# Patient Record
Sex: FEMALE | Race: OTHER | NOT HISPANIC OR LATINO
[De-identification: names, ages, dates, MRNs, and addresses within clinical notes are randomized per-mention and may not be internally consistent; named-entity substitution may affect disease eponyms.]

---

## 2017-07-12 ENCOUNTER — APPOINTMENT (OUTPATIENT)
Dept: OTOLARYNGOLOGY | Facility: CLINIC | Age: 47
End: 2017-07-12

## 2017-07-12 VITALS — HEIGHT: 66 IN | BODY MASS INDEX: 28.93 KG/M2 | WEIGHT: 180 LBS

## 2017-07-12 DIAGNOSIS — F17.210 NICOTINE DEPENDENCE, CIGARETTES, UNCOMPLICATED: ICD-10-CM

## 2017-07-12 DIAGNOSIS — Z80.3 FAMILY HISTORY OF MALIGNANT NEOPLASM OF BREAST: ICD-10-CM

## 2017-07-12 DIAGNOSIS — E16.2 HYPOGLYCEMIA, UNSPECIFIED: ICD-10-CM

## 2017-07-12 DIAGNOSIS — Z87.891 PERSONAL HISTORY OF NICOTINE DEPENDENCE: ICD-10-CM

## 2017-07-20 ENCOUNTER — APPOINTMENT (OUTPATIENT)
Dept: OTOLARYNGOLOGY | Facility: CLINIC | Age: 47
End: 2017-07-20

## 2019-05-28 ENCOUNTER — APPOINTMENT (OUTPATIENT)
Dept: OTOLARYNGOLOGY | Facility: CLINIC | Age: 49
End: 2019-05-28
Payer: MEDICAID

## 2019-05-28 VITALS
HEIGHT: 66 IN | BODY MASS INDEX: 28.93 KG/M2 | HEART RATE: 75 BPM | SYSTOLIC BLOOD PRESSURE: 139 MMHG | WEIGHT: 180 LBS | DIASTOLIC BLOOD PRESSURE: 89 MMHG

## 2019-05-28 PROCEDURE — 99204 OFFICE O/P NEW MOD 45 MIN: CPT | Mod: 25

## 2019-05-28 PROCEDURE — 31231 NASAL ENDOSCOPY DX: CPT

## 2019-05-28 RX ORDER — AMOXICILLIN AND CLAVULANATE POTASSIUM 875; 125 MG/1; MG/1
875-125 TABLET, COATED ORAL
Qty: 20 | Refills: 0 | Status: COMPLETED | COMMUNITY
Start: 2017-04-26 | End: 2019-05-28

## 2019-05-28 RX ORDER — OMEPRAZOLE 40 MG/1
40 CAPSULE, DELAYED RELEASE ORAL
Qty: 30 | Refills: 2 | Status: ACTIVE | COMMUNITY
Start: 2019-05-28 | End: 1900-01-01

## 2019-06-05 NOTE — PHYSICAL EXAM
[Midline] : trachea located in midline position [Normal] : no rashes [de-identified] : Good flow b/l

## 2019-06-05 NOTE — ASSESSMENT
[FreeTextEntry1] : pt with reported left SMG stone with sx that may have some contribution. 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 Escalera recommended removal, will consider and may do sialendoscopy at the same time\par get u/s report with stone information \par Will proceed with regiment to increase salivary flow to reduce pooling in the gland and washout any possible obstruction if possible. Recommended hydration, regular massage, sour candies, and warm compress\par \par \par I personally saw and examined BRIDGETT SERRA in detail. I spoke to HENRIK Resendiz regarding the assessment and plan of care.  I preformed the procedures and I reviewed the above assessment and plan of care, and agree. I have made changes in changes in the body of the note where appropriate.\par \par

## 2019-06-05 NOTE — HISTORY OF PRESENT ILLNESS
[de-identified] : 47 y/o female referred for possible left SMG stone seen on prior neck US. She has several year history of left sided cheek and upper neck pain with foul taste in mouth. Also with dry mouth and some difficulty swallowing solids and liquids. + Non-productive cough. Pain is not worse with eating. No throat pain, no neck or cheek swelling. \par \par Also with nasal dryness and pressure under left eye many years. Prior sinus surgery - 4 years ago- left sided- for recurrent sinus infections and "cyst" removal.  Has had 2 sinus infections in the past 12 months and at least 4 sinus infections the year prior treated with oral abx. No nasal obstruction. No runny nose. \par \par \par \par

## 2019-06-05 NOTE — CONSULT LETTER
[DrDevan  ___] : Dr. THOMAS [FreeTextEntry2] : ENt located at [facility] in [place]  [FreeTextEntry1] : Dear Dr. Chuy Addison,\par \par I had the pleasure of evaluating your patient BRIDGETT SERRA  thank you for allowing us to participate in their care. please see full note detailing our visit below.\par If you have any questions, please do not hesitate to call me and I would be happy to discuss further. \par \par Kai Williamson M.D.\par Attending Physician,  \par Department of Otolaryngology - Head and Neck Surgery\par [facility] \par Office: [phone]\par Fax: [phone]\par

## 2019-06-05 NOTE — PROCEDURE
[FreeTextEntry6] : Procedure performed: Nasal Endoscopy- Diagnostic\par Pre / post -procedure indication(s): nasal congestion\par Verbal and/or written consent obtained from patient\par Scope #: 19,  flexible fiber optic telescope used\par The scope was introduced in the nasal passage between the middle and inferior turbinates to exam the inferior portion of the middle meatus and the fontanelle, as well as the maxillary ostia.  Next, the scope was passed medically and posteriorly to the middle turbinates to examine the sphenoethmoid recess and the superior turbinate region.\par Upon visualization the finders are as follows:\par Nasal Septum: septum midline\par B/L: Mucosa: pink and moist, Mucous: scant, Polyp: not seen, Inferior Turbinate, Middle and Superior Turbinate: normal, Inferior Meatus: narrow, Middle Meatus: narrow, Super Meatus:normal, Sphenoethmoidal Recess: clear, post-surgical changes.  Eustachian tube clear. \par The patient tolerated the procedure well\par  [de-identified] : Procedure performed: laryngeal Endoscopy- Diagnostic\par Pre-op/post op indication: dysphonia\par Verbal and/or written consent obtained from patient, Patient was unable to cooperate with mirror\par Scope #: 19, flexible fiber optic telescope used \par Scope was introduced through the nose passed on the floor of the nose to the nasopharynx and then followed down the soft palate to the lower pharynx. The tongue Base, Larynx, Hypopharynx were examined. Base of tongue was symmetric, vallecular was clear, epiglottis was not deformed, subglottis/ pyriform and posterior pharyngeal walls were clear. No erythema, edema, pooling of secretions, masses or lesions. Airway patent, no foreign body visualized. No glottic/supraglottic edema. VF clear arytenoids, vestibular folds, ventricles, pyriform sinuses, and aryepiglottic folds appear normal bilaterally. Vocal cords mobile with good contact b/l. + Vocal fold nodules.\par \par

## 2019-07-01 ENCOUNTER — APPOINTMENT (OUTPATIENT)
Dept: OTOLARYNGOLOGY | Facility: CLINIC | Age: 49
End: 2019-07-01
Payer: MEDICAID

## 2019-07-01 VITALS
SYSTOLIC BLOOD PRESSURE: 133 MMHG | HEART RATE: 66 BPM | DIASTOLIC BLOOD PRESSURE: 84 MMHG | BODY MASS INDEX: 28.93 KG/M2 | HEIGHT: 66 IN | WEIGHT: 180 LBS

## 2019-07-01 PROCEDURE — 99214 OFFICE O/P EST MOD 30 MIN: CPT | Mod: 25

## 2019-07-01 PROCEDURE — 31231 NASAL ENDOSCOPY DX: CPT

## 2019-07-01 RX ORDER — MUPIROCIN 20 MG/G
2 OINTMENT TOPICAL
Qty: 1 | Refills: 0 | Status: ACTIVE | COMMUNITY
Start: 2019-07-01 | End: 1900-01-01

## 2019-07-01 RX ORDER — IPRATROPIUM BROMIDE 21 UG/1
0.03 SPRAY NASAL 3 TIMES DAILY
Qty: 3 | Refills: 3 | Status: ACTIVE | COMMUNITY
Start: 2019-07-01 | End: 1900-01-01

## 2019-07-01 NOTE — PHYSICAL EXAM
[Midline] : trachea located in midline position [Normal] : no rashes [de-identified] : Good flow b/l

## 2019-07-01 NOTE — ASSESSMENT
[FreeTextEntry1] : pt with reported left SMG stone with sx that may have some contribution - bothersome sx do not seem to corollate to area. 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 Handout detailing regiment also given\par \par will see if improves sx or left VF lesion which sounds like has been long standing, Dr. Escalera recommended removal, will consider and may do sialendoscopy at the same time, however sx complaining of do not correlate to either so will continue to treat and surgery may not be best option of treatment \par if persists will get repeat CT\par \par get u/s report with stone information \par Will proceed with regiment to increase salivary flow to reduce pooling in the gland and washout any possible obstruction if possible. Recommended hydration, regular massage, sour candies, and warm compress\par \par \par I personally saw and examined BRIDGETT SERRA in detail. I spoke to HENRIK Resendiz regarding the assessment and plan of care.  I preformed the procedures and I reviewed the above assessment and plan of care, and agree. I have made changes in changes in the body of the note where appropriate.\par \par \par

## 2019-07-01 NOTE — PROCEDURE
[FreeTextEntry6] : Procedure performed: Nasal Endoscopy- Diagnostic\par Pre / post -procedure indication(s): nasal congestion\par Verbal and/or written consent obtained from patient\par Scope #: 19,  flexible fiber optic telescope used\par The scope was introduced in the nasal passage between the middle and inferior turbinates to exam the inferior portion of the middle meatus and the fontanelle, as well as the maxillary ostia.  Next, the scope was passed medically and posteriorly to the middle turbinates to examine the sphenoethmoid recess and the superior turbinate region.\par Upon visualization the finders are as follows:\par Nasal Septum: septum midline\par B/L: Mucosa: pink and moist, Mucous: scant, Polyp: not seen, Inferior Turbinate, Middle and Superior Turbinate: normal, Inferior Meatus: narrow, Middle Meatus: narrow, Super Meatus:normal, Sphenoethmoidal Recess: clear, post-surgical changes.  Eustachian tube clear. \par The patient tolerated the procedure well\par  [de-identified] : Procedure performed: laryngeal Endoscopy- Diagnostic\par Pre-op/post op indication: dysphonia\par Verbal and/or written consent obtained from patient, Patient was unable to cooperate with mirror\par Scope #: 19, flexible fiber optic telescope used \par Scope was introduced through the nose passed on the floor of the nose to the nasopharynx and then followed down the soft palate to the lower pharynx. The tongue Base, Larynx, Hypopharynx were examined. Base of tongue was symmetric, vallecular was clear, epiglottis was not deformed, subglottis/ pyriform and posterior pharyngeal walls were clear. No erythema, edema, pooling of secretions, masses or lesions. Airway patent, no foreign body visualized. No glottic/supraglottic edema. VF clear arytenoids, vestibular folds, ventricles, pyriform sinuses, and aryepiglottic folds appear normal bilaterally. Vocal cords mobile with good contact b/l. + Small mid- left vocal fold polyp.\par \par

## 2019-07-01 NOTE — CONSULT LETTER
[DrDevan  ___] : Dr. THOMAS [FreeTextEntry2] : ENt located at [facility] in [place]  [FreeTextEntry1] : Dear Dr. Chuy Addison,\par \par I had the pleasure of evaluating your patient BRIDGETT SERRA  thank you for allowing us to participate in their care. please see full note detailing our visit below.\par If you have any questions, please do not hesitate to call me and I would be happy to discuss further. \par \par Kai Williamson M.D.\par Attending Physician,  \par Department of Otolaryngology - Head and Neck Surgery\[facility] \par Office: [phone]\par Fax: [phone]\par

## 2019-07-01 NOTE — HISTORY OF PRESENT ILLNESS
[de-identified] : 49 y/o female referred by Dr. Escalera with several year history of left sided cheek pain, swelling and pain under tongue a/w with foul taste in mouth. Also with dry mouth and some difficulty swallowing solids and liquids. Pain is not worse with eating. CT neck 10/9/17 showed mild right side SMG fullness w/o focal mass, hasn't had any imaging since. Feels left sided cheek swelling and pain worsened since last visit. Has foul pus like taste in mouth\par \par Also discussed LPR and a vocal fold polyp on the left at last visit and started on reflux lifestyle management. \par \par + Throat clearing/globus\par \par No throat pain.\par \par \par \par \par \par \par \par

## 2019-08-06 ENCOUNTER — APPOINTMENT (OUTPATIENT)
Dept: OTOLARYNGOLOGY | Facility: CLINIC | Age: 49
End: 2019-08-06
Payer: MEDICAID

## 2019-08-06 ENCOUNTER — APPOINTMENT (OUTPATIENT)
Dept: OTOLARYNGOLOGY | Facility: CLINIC | Age: 49
End: 2019-08-06

## 2019-08-06 VITALS
HEART RATE: 86 BPM | BODY MASS INDEX: 29.73 KG/M2 | SYSTOLIC BLOOD PRESSURE: 113 MMHG | DIASTOLIC BLOOD PRESSURE: 78 MMHG | HEIGHT: 66 IN | WEIGHT: 185 LBS

## 2019-08-06 PROCEDURE — 99214 OFFICE O/P EST MOD 30 MIN: CPT | Mod: 25

## 2019-08-06 PROCEDURE — 31231 NASAL ENDOSCOPY DX: CPT

## 2019-08-06 NOTE — ASSESSMENT
[FreeTextEntry1] : pt with reported left SMG stone with sx that may have some contribution - bothersome sx do not seem to corollate to area. 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 Handout detailing regiment also given\par \par will see if improves sx or left VF lesion which sounds like has been long standing, Dr. Escalera recommended removal, \par pt would like to get a biopsy of the left medial maxillary wall and kenolag injection, shes understands may not help sx any any trauma may make worse.\par would also like to consider left SMG dilation, if fails will consider sialendoscopy \par Ct scan sinus and neck for sinus pain and SMG/ laryngeal eval \par she understands procedure has a high failure rate, being being very conservative and would like to try \par Will proceed with regiment to increase salivary flow to reduce pooling in the gland and washout any possible obstruction if possible. Recommended hydration, regular massage, sour candies, and warm compress\par \par \par I personally saw and examined BRIDGETT SERRA in detail. I spoke to HENRIK Resendiz regarding the assessment and plan of care.  I preformed the procedures and I reviewed the above assessment and plan of care, and agree. I have made changes in changes in the body of the note where appropriate.\par \par \par

## 2019-08-06 NOTE — HISTORY OF PRESENT ILLNESS
[de-identified] : 49 y/o female referred by Dr. Escalera with several year history of left sided cheek pain, swelling and pain under tongue a/w with foul taste in mouth. Also with dry mouth and some difficulty swallowing solids and liquids. Pain is not worse with eating. CT neck 10/9/17 showed mild right side SMG fullness w/o focal mass, hasn't had any imaging since.\par \par Also discussed LPR and a vocal fold polyp on the left at last visit and started on reflux lifestyle management. Still with throat clearing and PND- very bothersome. Tried Atrovent nasal spray- no improvement.\par No throat pain.\par \par At last visit also c/o irritation and pain of the left side of nose with some erythema on the septum in that area. Was started on Mupirocin ointment. Doesn't feel any improvement.\par \par \par \par \par \par \par \par  [FreeTextEntry1] : \par \par

## 2019-08-06 NOTE — CONSULT LETTER
[DrDevan  ___] : Dr. THOMAS [FreeTextEntry2] : ENt located at [facility] in [place]  [FreeTextEntry1] : Dear Dr. Chuy Addison,\par \par I had the pleasure of evaluating your patient BRIDGETT SERRA  thank you for allowing us to participate in their care. please see full note detailing our visit below.\par If you have any questions, please do not hesitate to call me and I would be happy to discuss further. \par \par Kai Williamson M.D.\par Attending Physician,  \par Department of Otolaryngology - Head and Neck Surgery\par [facility] \par Office: [phone]\par Fax: [phone]\par

## 2019-08-06 NOTE — PROCEDURE
[FreeTextEntry6] : Procedure performed: Nasal Endoscopy- Diagnostic\par Pre / post -procedure indication(s): nasal congestion\par Verbal and/or written consent obtained from patient\par Scope #: 19,  flexible fiber optic telescope used\par The scope was introduced in the nasal passage between the middle and inferior turbinates to exam the inferior portion of the middle meatus and the fontanelle, as well as the maxillary ostia.  Next, the scope was passed medically and posteriorly to the middle turbinates to examine the sphenoethmoid recess and the superior turbinate region.\par Upon visualization the finders are as follows:\par Nasal Septum: septum midline\par B/L: Mucosa: pink and moist, Mucous: scant, Polyp: not seen, Inferior Turbinate, Middle and Superior Turbinate: normal, Inferior Meatus: narrow, Middle Meatus: narrow, Super Meatus:normal, Sphenoethmoidal Recess: clear, post-surgical changes- pale area surround left maxillary antrostomy.  Eustachian tube clear. \par The patient tolerated the procedure well\par  [de-identified] : Procedure performed: laryngeal Endoscopy- Diagnostic\par Pre-op/post op indication: dysphonia\par Verbal and/or written consent obtained from patient, Patient was unable to cooperate with mirror\par Scope #: 19, flexible fiber optic telescope used \par Scope was introduced through the nose passed on the floor of the nose to the nasopharynx and then followed down the soft palate to the lower pharynx. The tongue Base, Larynx, Hypopharynx were examined. Base of tongue was symmetric, vallecular was clear, epiglottis was not deformed, subglottis/ pyriform and posterior pharyngeal walls were clear. No erythema, edema, pooling of secretions, masses or lesions. Airway patent, no foreign body visualized. No glottic/supraglottic edema. VF clear arytenoids, vestibular folds, ventricles, pyriform sinuses, and aryepiglottic folds appear normal bilaterally. Vocal cords mobile with good contact b/l. + Small mid- left vocal fold polyp.\par \par

## 2019-08-06 NOTE — PHYSICAL EXAM
[Midline] : trachea located in midline position [Normal] : no rashes [de-identified] : Good flow b/l

## 2019-09-09 ENCOUNTER — LABORATORY RESULT (OUTPATIENT)
Age: 49
End: 2019-09-09

## 2019-09-10 ENCOUNTER — APPOINTMENT (OUTPATIENT)
Dept: OTOLARYNGOLOGY | Facility: CLINIC | Age: 49
End: 2019-09-10
Payer: MEDICAID

## 2019-09-10 VITALS
DIASTOLIC BLOOD PRESSURE: 86 MMHG | HEIGHT: 66 IN | HEART RATE: 95 BPM | BODY MASS INDEX: 29.73 KG/M2 | WEIGHT: 185 LBS | SYSTOLIC BLOOD PRESSURE: 125 MMHG

## 2019-09-10 DIAGNOSIS — K11.5 SIALOLITHIASIS: ICD-10-CM

## 2019-09-10 DIAGNOSIS — K21.9 GASTRO-ESOPHAGEAL REFLUX DISEASE W/OUT ESOPHAGITIS: ICD-10-CM

## 2019-09-10 PROCEDURE — 99213 OFFICE O/P EST LOW 20 MIN: CPT | Mod: 25

## 2019-09-10 PROCEDURE — 42660 DILATION OF SALIVARY DUCT: CPT | Mod: LT,59

## 2019-09-10 PROCEDURE — 31237 NSL/SINS NDSC SURG BX POLYPC: CPT | Mod: LT

## 2019-09-10 RX ORDER — CIPROFLOXACIN HYDROCHLORIDE 500 MG/1
500 TABLET, FILM COATED ORAL
Qty: 20 | Refills: 0 | Status: ACTIVE | COMMUNITY
Start: 2019-08-19

## 2019-09-10 RX ORDER — DICYCLOMINE HYDROCHLORIDE 10 MG/1
10 CAPSULE ORAL
Qty: 60 | Refills: 0 | Status: ACTIVE | COMMUNITY
Start: 2019-08-19

## 2019-09-10 RX ORDER — TOBRAMYCIN AND DEXAMETHASONE 3; 1 MG/G; MG/G
0.3-0.1 OINTMENT OPHTHALMIC
Qty: 35 | Refills: 0 | Status: ACTIVE | COMMUNITY
Start: 2019-08-19

## 2019-09-10 RX ORDER — METRONIDAZOLE 500 MG/1
500 TABLET ORAL
Qty: 30 | Refills: 0 | Status: ACTIVE | COMMUNITY
Start: 2019-08-19

## 2019-09-10 NOTE — PROCEDURE
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 they elected to proceed\par procedure - using microscopy and massage the punctum was identified.[employer] punctum dilators dilated punctum 1-5 followed by conical dilator. Lacrimal probes introduced then introduced to dilate the full duct and sequential dilated form 0000 to size 7. did feel area of narrowing dilated. pt tolerated well. kenalog 10- NDC [id number] infused through the duct - 1cc, excess that came out of the duct disposed of\par  [FreeTextEntry6] : procedure - left endoscopic nasal  debridement with kenalog injection \par left nasal cavities inspected with #r15 ridged sinus endoscope. septum appeared midline and turbinates well reduced. left middle meatuses were explored and suction - thick mucous suctioned form maxillary sinus, area pt felt was the location of discomfort was the left uncinate process - biopsy taken and area injected with  Lidocaine with Epi NDC# 28744-230-08 3 parts and and kenalog 40 one part.  dilated were used to open ostiums and open any forming scar bands. all sinuses were explored and open at end of procedure\par

## 2019-09-10 NOTE — ASSESSMENT
[FreeTextEntry1] : pt with reported left SMG stone with sx that may have some contribution - bothersome sx do not seem to corollate to area. 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 Handout detailing regiment also given\par \par will see if improves sx or left VF lesion which sounds like has been long standing, Dr. Escalera recommended removal, \par \par \par pt would like to get a biopsy of the left medial maxillary wall and kenolag injection, shes understands may not help sx any any trauma may make worse.\par procedure done today - left SMG dilation, kenalog injection and biopsy done of maxilla \par Will proceed with regiment to increase salivary flow to reduce pooling in the gland and washout any possible obstruction if possible. Recommended hydration, regular massage, sour candies, and warm compress\par

## 2019-09-10 NOTE — CONSULT LETTER
[DrDevan  ___] : Dr. THOMAS [FreeTextEntry2] : ENt located at [facility] in [place]  [FreeTextEntry1] : Dear Dr. Chuy Addison,\par \par I had the pleasure of evaluating your patient BRIDGETT SERRA  thank you for allowing us to participate in their care. please see full note detailing our visit below.\par If you have any questions, please do not hesitate to call me and I would be happy to discuss further. \par \par Kai Williamson M.D.\par Attending Physician,  \par Department of Otolaryngology - Head and Neck Surgery\par [facility] \par Office: [phone]\par Fax: [phone]\par

## 2019-09-10 NOTE — PHYSICAL EXAM
[Midline] : trachea located in midline position [Normal] : no rashes [de-identified] : Good flow b/l

## 2019-09-24 ENCOUNTER — APPOINTMENT (OUTPATIENT)
Dept: OTOLARYNGOLOGY | Facility: CLINIC | Age: 49
End: 2019-09-24
Payer: MEDICAID

## 2019-09-24 VITALS
DIASTOLIC BLOOD PRESSURE: 80 MMHG | SYSTOLIC BLOOD PRESSURE: 122 MMHG | HEIGHT: 66 IN | WEIGHT: 185 LBS | HEART RATE: 70 BPM | BODY MASS INDEX: 29.73 KG/M2

## 2019-09-24 DIAGNOSIS — K11.23 CHRONIC SIALOADENITIS: ICD-10-CM

## 2019-09-24 DIAGNOSIS — J30.0 VASOMOTOR RHINITIS: ICD-10-CM

## 2019-09-24 DIAGNOSIS — J38.2 NODULES OF VOCAL CORDS: ICD-10-CM

## 2019-09-24 DIAGNOSIS — K13.79 OTHER LESIONS OF ORAL MUCOSA: ICD-10-CM

## 2019-09-24 PROCEDURE — 31237 NSL/SINS NDSC SURG BX POLYPC: CPT | Mod: LT

## 2019-09-24 PROCEDURE — 11900 INJECT SKIN LESIONS </W 7: CPT

## 2019-09-24 PROCEDURE — 99214 OFFICE O/P EST MOD 30 MIN: CPT | Mod: 25

## 2019-09-25 PROBLEM — K13.79 OTHER LESIONS OF ORAL MUCOSA: Status: ACTIVE | Noted: 2019-09-25

## 2019-10-02 NOTE — CONSULT LETTER
[DrDevan  ___] : Dr. THOMAS [FreeTextEntry2] : ENT located at [facility] in [place]  [FreeTextEntry1] : Dear Dr. Chuy Addison,\par \par I had the pleasure of evaluating your patient BRIDGETT SERRA  thank you for allowing us to participate in their care. please see full note detailing our visit below.\par If you have any questions, please do not hesitate to call me and I would be happy to discuss further. \par \par Kai Williamson M.D.\par Attending Physician,  \par Department of Otolaryngology - Head and Neck Surgery\par [facility] \par Office: [phone]\par Fax: [phone]\par

## 2019-10-02 NOTE — PROCEDURE
[FreeTextEntry3] : Procedure: Kenalog injection \par clean \par EBL : Minimal \par Anesthesia: Local \par area cleaned with alcohol \par 1 part Kenalog 10 NDC # 7784-7224-83 to 1 parts lido with epi  Lidocaine with Epi NDC# 03264-278-23 infused into area of scar in upper lip , nasal vestibule and lateral nose on left. felt immediate improvement in sx. very happy with injection\par Patient tolerated procedure well with no complications.\par \par  [FreeTextEntry6] : procedure - left endoscopic nasal  debridement\par Bilateral nasal cavities inspected with #0 ridged sinus endoscope. septum appeared midline and turbinates well reduced. sinuses open, thick mucous suctioned from left maxillary. all sinuses explored and suction and agitator were used to open ostiums and open any forming scar bands. all sinuses were explored and open at end of procedure\par

## 2019-10-02 NOTE — PHYSICAL EXAM
[Midline] : trachea located in midline position [Normal] : no rashes [de-identified] : scarring in area of pain lateral to maxillary frenulum on the left  [de-identified] : Good flow b/l

## 2019-10-02 NOTE — ASSESSMENT
[FreeTextEntry1] : facial pain - injected today, improved sx\par sinus debrided s/p biopsy last visit - did not feel helped\par dilated SMG - did not feel helped \par will consider serial injections if feels helps \par \par  vocal fold polyp on the left  - has been stable, not bothersome \par does complain about anterior nasal irrigation with some erythema on the septum in that area\par also PND\par \par \par \par \par I personally saw and examined BRIDGETT SERRA in detail. I spoke to HENRIK Resendiz regarding the assessment and plan of care.  I preformed the procedures and I reviewed the above assessment and plan of care, and agree. I have made changes in changes in the body of the note where appropriate.\par \par \par

## 2019-10-02 NOTE — HISTORY OF PRESENT ILLNESS
[de-identified] : 49 y/o female referred by Dr. Escalera with several year history of left sided cheek pain, swelling and pain under tongue a/w with foul taste in mouth. Also with dry mouth and some difficulty swallowing solids and liquids. Pain is not worse with eating. CT neck 10/9/17 showed mild right side SMG fullness w/o focal mass, hasn't had any imaging since.\par \par Also discussed LPR and a vocal fold polyp on the left at last visit and started on reflux lifestyle management. Still with throat clearing and PND- very bothersome. Tried Atrovent nasal spray- no improvement.\par No throat pain.\par \par At last visit also c/o irritation and pain of the left side of nose with some erythema on the septum in that area. Was started on Mupirocin ointment. Doesn't feel any improvement.\par \par \par \par \par \par \par \par  [FreeTextEntry1] : pt got a bx and salivary duct dilation last visit\par feels cheek pain stable \par still with foul taste in the mouth \par feels a little bump in upper buccal area source of pain, would like to get Bx \par

## 2019-10-08 ENCOUNTER — APPOINTMENT (OUTPATIENT)
Dept: OTOLARYNGOLOGY | Facility: CLINIC | Age: 49
End: 2019-10-08
Payer: MEDICAID

## 2019-10-08 VITALS
WEIGHT: 185 LBS | BODY MASS INDEX: 29.73 KG/M2 | HEART RATE: 80 BPM | SYSTOLIC BLOOD PRESSURE: 127 MMHG | DIASTOLIC BLOOD PRESSURE: 88 MMHG | HEIGHT: 66 IN

## 2019-10-08 DIAGNOSIS — R49.0 DYSPHONIA: ICD-10-CM

## 2019-10-08 DIAGNOSIS — J34.3 HYPERTROPHY OF NASAL TURBINATES: ICD-10-CM

## 2019-10-08 DIAGNOSIS — J34.89 OTHER SPECIFIED DISORDERS OF NOSE AND NASAL SINUSES: ICD-10-CM

## 2019-10-08 PROCEDURE — 99213 OFFICE O/P EST LOW 20 MIN: CPT | Mod: 25

## 2019-10-08 PROCEDURE — 11900 INJECT SKIN LESIONS </W 7: CPT

## 2019-10-08 PROCEDURE — 31231 NASAL ENDOSCOPY DX: CPT

## 2019-10-10 NOTE — HISTORY OF PRESENT ILLNESS
[de-identified] : 47 y/o female referred by Dr. Escalera with several year history of left sided cheek pain, swelling and pain under tongue a/w with foul taste in mouth. Also with dry mouth and some difficulty swallowing solids and liquids. Pain is not worse with eating. CT neck 10/9/17 showed mild right side SMG fullness w/o focal mass, hasn't had any imaging since.\par \par Also discussed LPR and a vocal fold polyp on the left at last visit and started on reflux lifestyle management. Still with throat clearing and PND- very bothersome. Tried Atrovent nasal spray- no improvement.\par No throat pain.\par \par At last visit also c/o irritation and pain of the left side of nose with some erythema on the septum in that area. Was started on Mupirocin ointment. Doesn't feel any improvement.\par \par \par \par \par \par \par \par  [FreeTextEntry1] : pt got a bx and salivary duct dilation last visit\par feels cheek pain stable, short improvement after last injection but sx have returned\par still with foul taste in the mouth \par feels a little bump in upper buccal area source of pain, would like to get Bx \par

## 2019-10-10 NOTE — CONSULT LETTER
[DrDevan  ___] : Dr. THOMAS [FreeTextEntry2] : ENT located at [facility] in [place]  [FreeTextEntry1] : Dear Dr. Chuy Addison,\par \par I had the pleasure of evaluating your patient BRIDGETT SERRA  thank you for allowing us to participate in their care. please see full note detailing our visit below.\par If you have any questions, please do not hesitate to call me and I would be happy to discuss further. \par \par Kai Williamson M.D.\par Attending Physician,  \par Department of Otolaryngology - Head and Neck Surgery\par [facility] \par Office: [phone]\par Fax: [phone]\par

## 2019-10-10 NOTE — PHYSICAL EXAM
[Midline] : trachea located in midline position [Normal] : no rashes [de-identified] : scarring in area of pain lateral to maxillary frenulum on the left  [de-identified] : Good flow b/l

## 2019-10-10 NOTE — ASSESSMENT
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

## 2019-10-10 NOTE — PROCEDURE
[FreeTextEntry3] : Procedure: Kenalog injection \par clean \par EBL : Minimal \par Anesthesia: Local \par area cleaned with alcohol \par 1 part Kenalog 10 NDC # 9860-1950-80 to 1 parts lido with epi  Lidocaine with Epi NDC# 67706-918-68 infused into area of scar in upper lip , nasal vestibule and lateral nose on left. felt immediate improvement in sx. \par Patient tolerated procedure well with no complications.\par \par  [FreeTextEntry6] : Procedure performed: Nasal Endoscopy- Diagnostic\par Pre-op indication(s): nasal congestion\par Post-op indication(s): nasal congestion \par Verbal and/or written consent obtained from patient\par Anterior rhinoscopy insufficient to account for symptoms\par Scope #: 3,  flexible fiber optic telescope \par The scope was introduced in the nasal passage between the middle and inferior turbinates to exam the inferior portion of the middle meatus and the fontanelle, as well as the maxillary ostia.  Next, the scope was passed medically and posteriorly to the middle turbinates to examine the sphenoethmoid recess and the superior turbinate region.\par Upon visualization the finders are as follows:\par Nasal Septum: sigmoidal septal deviation\par Bilateral - Mucosa: boggy turbinates, Mucous: scant, Polyp: not seen, Inferior Turbinate: boggy, Middle Turbinate: normal, Superior Turbinate: normal, Inferior Meatus: narrow, Middle Meatus: post surgical, Super Meatus:normal, Sphenoethmoidal Recess: clear\par

## 2019-10-22 ENCOUNTER — APPOINTMENT (OUTPATIENT)
Dept: OTOLARYNGOLOGY | Facility: CLINIC | Age: 49
End: 2019-10-22
Payer: MEDICAID

## 2019-10-22 VITALS
SYSTOLIC BLOOD PRESSURE: 137 MMHG | DIASTOLIC BLOOD PRESSURE: 91 MMHG | HEIGHT: 66 IN | HEART RATE: 87 BPM | BODY MASS INDEX: 29.73 KG/M2 | WEIGHT: 185 LBS

## 2019-10-22 DIAGNOSIS — K13.6 IRRITATIVE HYPERPLASIA OF ORAL MUCOSA: ICD-10-CM

## 2019-10-22 DIAGNOSIS — J32.9 CHRONIC SINUSITIS, UNSPECIFIED: ICD-10-CM

## 2019-10-22 DIAGNOSIS — J32.0 CHRONIC MAXILLARY SINUSITIS: ICD-10-CM

## 2019-10-22 DIAGNOSIS — G50.1 ATYPICAL FACIAL PAIN: ICD-10-CM

## 2019-10-22 PROCEDURE — 99213 OFFICE O/P EST LOW 20 MIN: CPT | Mod: 25

## 2019-10-22 PROCEDURE — 31231 NASAL ENDOSCOPY DX: CPT

## 2019-10-24 NOTE — ASSESSMENT
[FreeTextEntry1] : facial pain - two injected, improved sx when lidocaine in effect but not durable. 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

## 2019-10-24 NOTE — PROCEDURE
[FreeTextEntry6] : Procedure performed: Nasal Endoscopy- Diagnostic\par Pre-op indication(s): nasal congestion\par Post-op indication(s): nasal congestion \par Verbal and/or written consent obtained from patient\par Anterior rhinoscopy insufficient to account for symptoms\par Scope #: 3,  flexible fiber optic telescope \par The scope was introduced in the nasal passage between the middle and inferior turbinates to exam the inferior portion of the middle meatus and the fontanelle, as well as the maxillary ostia.  Next, the scope was passed medically and posteriorly to the middle turbinates to examine the sphenoethmoid recess and the superior turbinate region.\par Upon visualization the finders are as follows:\par Nasal Septum: sigmoidal septal deviation\par Bilateral - Mucosa: boggy turbinates, Mucous: scant, Polyp: not seen, Inferior Turbinate: boggy, Middle Turbinate: normal, Superior Turbinate: normal, Inferior Meatus: narrow, Middle Meatus: post surgical, Super Meatus:normal, Sphenoethmoidal Recess: clear\par

## 2019-10-24 NOTE — PHYSICAL EXAM
[Midline] : trachea located in midline position [Normal] : no rashes [de-identified] : scarring in area of pain lateral to maxillary frenulum on the left  [de-identified] : Good flow b/l

## 2019-10-24 NOTE — CONSULT LETTER
[DrDevan  ___] : Dr. THOMAS [FreeTextEntry2] : ENT located at [facility] in [place]  [FreeTextEntry1] : Dear Dr. Chuy Addison,\par \par I had the pleasure of evaluating your patient BRIDGETT SERRA  thank you for allowing us to participate in their care. please see full note detailing our visit below.\par If you have any questions, please do not hesitate to call me and I would be happy to discuss further. \par \par Kai Williamson M.D.\par Attending Physician,  \par Department of Otolaryngology - Head and Neck Surgery\par [facility] \par Office: [phone]\par Fax: [phone]\par

## 2019-10-24 NOTE — HISTORY OF PRESENT ILLNESS
[de-identified] : 49 y/o female referred by Dr. Escalera with several year history of left sided cheek pain, swelling and pain under tongue a/w with foul taste in mouth. Also with dry mouth and some difficulty swallowing solids and liquids. Pain is not worse with eating. CT neck 10/9/17 showed mild right side SMG fullness w/o focal mass, hasn't had any imaging since.\par \par Also discussed LPR and a vocal fold polyp on the left at last visit and started on reflux lifestyle management. Still with throat clearing and PND- very bothersome. Tried Atrovent nasal spray- no improvement.\par No throat pain.\par \par \par \par \par \par \par \par \par \par  [FreeTextEntry1] : Pt got additional Lido and Kenalog injection into area of scar in upper lip x2 , nasal vestibule and lateral nose on left\par short improvement after last injection but sx have returned again- no significant change over the last 3 weeks\par \par